# Patient Record
Sex: FEMALE | Race: WHITE | NOT HISPANIC OR LATINO
[De-identification: names, ages, dates, MRNs, and addresses within clinical notes are randomized per-mention and may not be internally consistent; named-entity substitution may affect disease eponyms.]

---

## 2023-07-11 ENCOUNTER — APPOINTMENT (OUTPATIENT)
Dept: OTOLARYNGOLOGY | Facility: CLINIC | Age: 78
End: 2023-07-11
Payer: COMMERCIAL

## 2023-07-11 VITALS
DIASTOLIC BLOOD PRESSURE: 72 MMHG | TEMPERATURE: 97.6 F | BODY MASS INDEX: 23.83 KG/M2 | HEIGHT: 63 IN | SYSTOLIC BLOOD PRESSURE: 126 MMHG | HEART RATE: 56 BPM | WEIGHT: 134.5 LBS

## 2023-07-11 DIAGNOSIS — R09.81 NASAL CONGESTION: ICD-10-CM

## 2023-07-11 DIAGNOSIS — R51.9 HEADACHE, UNSPECIFIED: ICD-10-CM

## 2023-07-11 PROCEDURE — 31231 NASAL ENDOSCOPY DX: CPT | Mod: 52

## 2023-07-11 PROCEDURE — 99204 OFFICE O/P NEW MOD 45 MIN: CPT | Mod: 25

## 2023-07-12 NOTE — HISTORY OF PRESENT ILLNESS
[de-identified] : 75F here for initial evaluation.\par \par For the past 8 months she has had a constellation of complaints.\par She c/o daily headache. Sometimes it is over her temples or scalp or forehead and affects both sides the same. There are no triggers, no light sensitivity, no blurriness and no h/o migraines.\par She reports high pitched noise in both her ears which varies in severity. Sleep is unaffected. There is no otorrhea, otalgia, tinnitus or vertigo. 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 There is some ear fullness that can be improved w jaw movement.\par Lastly, there is nasal congestion/obstruction with postnasal drip and mucus; both sides are affected the same. There is no green/yellow drainage. She has no allergies, but questions mold sensitivity.\par She feels all of these sx started after trying tryvaya nasal spray 12 months ago, which she stopped ~4 months later.\par She has been on multiple nasal steroid sprays, saline and steroid rinses without improvement. \par \par No imaging.\par \par Audiogram 5/11/23:\par R: normal hearing sloping to mild to moderate SNHL. SRT 15, 100%, type A\par L: normal hearing sloping to essentially mild SNHL. SRT 20, 100%, type A\par \par ROS otherwise unremarkable.

## 2023-07-12 NOTE — CONSULT LETTER
[Dear  ___] : Dear  [unfilled], [Courtesy Letter:] : I had the pleasure of seeing your patient, [unfilled], in my office today. [Consult Closing:] : Thank you very much for allowing me to participate in the care of this patient.  If you have any questions, please do not hesitate to contact me. [Sincerely,] : Sincerely, [FreeTextEntry3] : Duane West MD\par Department of Otolaryngology - Head and Neck Surgery

## 2023-07-12 NOTE — PROCEDURE
[FreeTextEntry3] : Nasal Endoscopy:\par -inferior turbinate hypertrophy\par -mild sinonasal mucosal erythema\par -mild b/l septal deviation\par -no mucopus or polyps\par -choana clear

## 2023-07-12 NOTE — PHYSICAL EXAM
[de-identified] : soft, flat [de-identified] : eac clear, tm intact, me clear [Nasal Endoscopy Performed] : nasal endoscopy was performed, see procedure section for findings [Midline] : trachea located in midline position [Normal] : no rashes

## 2023-07-12 NOTE — ASSESSMENT
[FreeTextEntry1] : 75F here for initial evaluation. For the past 8 months she has had a constellation of complaints.\par She c/o daily headache. Mostly it is over her temple (right>left), but sometimes over her scalp or forehead and affects both sides the same. There are no triggers, no light sensitivity, no blurriness and no h/o migraines.\par She reports high pitched noise in both her ears which varies in severity. Sleep is unaffected. There is no otorrhea, otalgia, tinnitus or vertigo. 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 There is some ear fullness that can be improved w jaw movement from time to time.\par Lastly, there is nasal congestion/obstruction with postnasal drip and mucus; both sides are affected the same. There is no green/yellow drainage. She has no allergies, but questions mold sensitivity. She feels all of these sx started after trying tyrvaya nasal spray 12 months ago, which she stopped ~4 months later. During all this time, she has been on multiple nasal steroid sprays, saline and steroid rinses without improvement. There is no imaging.\par Audiogram 5/11/23 shows essentially normal hearing sloping to mild/moderate SNHL, as above w excellent discrim and normal tympanograms. On exam, nasal endoscopy shows turbinate hypertrophy and minimal sinonasal mucosal erythema.\par -Her headaches do not seem rhinogenic in origin given description and exam and I would favor either primary headache syndrome which may be made worse due to TMJ. Would recommend MRI brain given sx length and neurology f/u thereafter.\par -Tinnitus is due to her high frequency SNHL on audiogram, which was discussed; ear protection, diet/lifestyle reviewed.\par -Nasal sx (congestion/obstruction, postnasal drip, mucus) seem due to chronic rhinitis and turbinate hypertrophy which remains despite maximal medical management. Will get CT sinus and RTO thereafter.\par Unclear relationship of all of these sx to her recent usage (and d/c) of the nasal spray tyrvaya, which certainly can cause nasal discomfort and sensitivity issues.

## 2023-09-19 ENCOUNTER — TRANSCRIPTION ENCOUNTER (OUTPATIENT)
Age: 78
End: 2023-09-19

## 2023-09-20 ENCOUNTER — APPOINTMENT (OUTPATIENT)
Dept: MRI IMAGING | Facility: HOSPITAL | Age: 78
End: 2023-09-20
Payer: COMMERCIAL

## 2023-09-20 ENCOUNTER — APPOINTMENT (OUTPATIENT)
Dept: CT IMAGING | Facility: HOSPITAL | Age: 78
End: 2023-09-20
Payer: COMMERCIAL

## 2023-10-04 ENCOUNTER — APPOINTMENT (OUTPATIENT)
Dept: MRI IMAGING | Facility: HOSPITAL | Age: 78
End: 2023-10-04

## 2023-10-04 ENCOUNTER — OUTPATIENT (OUTPATIENT)
Dept: OUTPATIENT SERVICES | Facility: HOSPITAL | Age: 78
LOS: 1 days | End: 2023-10-04
Payer: COMMERCIAL

## 2023-10-04 ENCOUNTER — APPOINTMENT (OUTPATIENT)
Dept: CT IMAGING | Facility: HOSPITAL | Age: 78
End: 2023-10-04

## 2023-10-04 PROCEDURE — 70486 CT MAXILLOFACIAL W/O DYE: CPT | Mod: 26

## 2023-10-04 PROCEDURE — 70553 MRI BRAIN STEM W/O & W/DYE: CPT

## 2023-10-04 PROCEDURE — 70553 MRI BRAIN STEM W/O & W/DYE: CPT | Mod: 26

## 2023-10-04 PROCEDURE — A9585: CPT

## 2023-10-04 PROCEDURE — 70486 CT MAXILLOFACIAL W/O DYE: CPT

## 2024-02-05 ENCOUNTER — NON-APPOINTMENT (OUTPATIENT)
Age: 79
End: 2024-02-05

## 2024-02-05 ENCOUNTER — APPOINTMENT (OUTPATIENT)
Dept: OPHTHALMOLOGY | Facility: CLINIC | Age: 79
End: 2024-02-05
Payer: COMMERCIAL

## 2024-02-05 PROCEDURE — 92136 OPHTHALMIC BIOMETRY: CPT

## 2024-02-05 PROCEDURE — 92025 CPTRIZED CORNEAL TOPOGRAPHY: CPT

## 2024-02-05 PROCEDURE — 92004 COMPRE OPH EXAM NEW PT 1/>: CPT

## 2024-02-05 PROCEDURE — 92250 FUNDUS PHOTOGRAPHY W/I&R: CPT

## 2024-04-24 NOTE — ASU PATIENT PROFILE, ADULT - NS PREOP UNDERSTANDS INFO
Informed pt about surgery time 1000, last meal 0000 no dairy, and last drink 0700 of water or apple juice 3 hrs before surgery. Bring photo ID and isurance card to the first floor. Must have an escort that is 18+. Leave jewelry, piercings, and contacts at home./yes

## 2024-04-24 NOTE — ASU PATIENT PROFILE, ADULT - NSICDXPASTMEDICALHX_GEN_ALL_CORE_FT
PAST MEDICAL HISTORY:  2019 novel coronavirus disease (COVID-19) 3/2024    Arthritis     Breast cancer     Hashimoto's disease     Irritable bowel syndrome (IBS) constipation

## 2024-04-24 NOTE — ASU PATIENT PROFILE, ADULT - NSICDXPASTSURGICALHX_GEN_ALL_CORE_FT
PAST SURGICAL HISTORY:  H/O breast surgery     H/O colonoscopy     H/O elbow surgery left    H/O wrist surgery     History of bunionectomy

## 2024-04-24 NOTE — ASU PATIENT PROFILE, ADULT - FALL HARM RISK - UNIVERSAL INTERVENTIONS
Bed in lowest position, wheels locked, appropriate side rails in place/Call bell, personal items and telephone in reach/Instruct patient to call for assistance before getting out of bed or chair/Non-slip footwear when patient is out of bed/[place] to call system/Physically safe environment - no spills, clutter or unnecessary equipment/Purposeful Proactive Rounding/Room/bathroom lighting operational, light cord in reach

## 2024-04-25 ENCOUNTER — APPOINTMENT (OUTPATIENT)
Dept: OPHTHALMOLOGY | Facility: AMBULATORY SURGERY CENTER | Age: 79
End: 2024-04-25
Payer: COMMERCIAL

## 2024-04-25 ENCOUNTER — TRANSCRIPTION ENCOUNTER (OUTPATIENT)
Age: 79
End: 2024-04-25

## 2024-04-25 ENCOUNTER — OUTPATIENT (OUTPATIENT)
Dept: OUTPATIENT SERVICES | Facility: HOSPITAL | Age: 79
LOS: 1 days | Discharge: HOME | End: 2024-04-25
Payer: COMMERCIAL

## 2024-04-25 VITALS
HEIGHT: 62 IN | HEART RATE: 57 BPM | DIASTOLIC BLOOD PRESSURE: 69 MMHG | RESPIRATION RATE: 16 BRPM | SYSTOLIC BLOOD PRESSURE: 125 MMHG | TEMPERATURE: 98 F | OXYGEN SATURATION: 97 % | WEIGHT: 132.5 LBS

## 2024-04-25 VITALS
OXYGEN SATURATION: 99 % | RESPIRATION RATE: 16 BRPM | DIASTOLIC BLOOD PRESSURE: 69 MMHG | TEMPERATURE: 98 F | HEART RATE: 59 BPM | SYSTOLIC BLOOD PRESSURE: 115 MMHG

## 2024-04-25 DIAGNOSIS — Z98.890 OTHER SPECIFIED POSTPROCEDURAL STATES: Chronic | ICD-10-CM

## 2024-04-25 PROCEDURE — V2787: CPT | Mod: RT

## 2024-04-25 PROCEDURE — 66984 XCAPSL CTRC RMVL W/O ECP: CPT | Mod: RT

## 2024-04-25 PROCEDURE — 6698F: CPT

## 2024-04-25 DEVICE — IMPLANTABLE DEVICE
Type: IMPLANTABLE DEVICE | Status: NON-FUNCTIONAL
Removed: 2024-04-25

## 2024-04-25 RX ORDER — OFLOXACIN 0.3 %
1 DROPS OPHTHALMIC (EYE)
Refills: 0 | Status: DISCONTINUED | OUTPATIENT
Start: 2024-04-25 | End: 2024-04-25

## 2024-04-25 RX ORDER — TROPICAMIDE 1 %
1 DROPS OPHTHALMIC (EYE)
Refills: 0 | Status: DISCONTINUED | OUTPATIENT
Start: 2024-04-25 | End: 2024-04-25

## 2024-04-25 RX ORDER — OXYCODONE HYDROCHLORIDE 5 MG/1
5 TABLET ORAL ONCE
Refills: 0 | Status: DISCONTINUED | OUTPATIENT
Start: 2024-04-25 | End: 2024-04-25

## 2024-04-25 RX ORDER — LINACLOTIDE 145 UG/1
1 CAPSULE, GELATIN COATED ORAL
Refills: 0 | DISCHARGE

## 2024-04-25 RX ORDER — LIOTHYRONINE SODIUM 25 UG/1
1 TABLET ORAL
Refills: 0 | DISCHARGE

## 2024-04-25 RX ORDER — LEVOTHYROXINE SODIUM 125 MCG
1 TABLET ORAL
Refills: 0 | DISCHARGE

## 2024-04-25 RX ORDER — PHENYLEPHRINE HCL 2.5 %
1 DROPS OPHTHALMIC (EYE)
Refills: 0 | Status: DISCONTINUED | OUTPATIENT
Start: 2024-04-25 | End: 2024-04-25

## 2024-04-25 RX ORDER — SODIUM CHLORIDE 9 MG/ML
1000 INJECTION, SOLUTION INTRAVENOUS
Refills: 0 | Status: DISCONTINUED | OUTPATIENT
Start: 2024-04-25 | End: 2024-04-25

## 2024-04-25 RX ORDER — ACETAMINOPHEN 500 MG
1000 TABLET ORAL ONCE
Refills: 0 | Status: DISCONTINUED | OUTPATIENT
Start: 2024-04-25 | End: 2024-04-25

## 2024-04-25 RX ADMIN — Medication 1 DROP(S): at 11:04

## 2024-04-25 RX ADMIN — Medication 1 DROP(S): at 10:55

## 2024-04-25 NOTE — OPERATIVE REPORT - OPERATIVE RPOSRT DETAILS
SURGEON: Pavan Estrada MD    ASSISTANT SURGEON:  Kaitlin Chaparro MD    DATE OF SURGERY:  04/25/24    ANESTHESIA:  MAC/TOPICAL		    COMPLICATIONS: none		    PREOPERATIVE DIAGNOSIS:  Nuclear sclerotic cataract and astigmatism, right eye    POSTOPERATIVE DIAGNOSIS:  Nuclear sclerotic cataract and astigmatism, right eye    OPERATIVE PROCEDURE:  1. Femtosecond laser assisted laser surgery, right eye  2. Phacoemulsification with insertion of posterior chamber intraocular lens, right eye  3. Intraoperative ORA, right eye    LENS IMPLANT: [id number] +22.0D    PROCEDURE:	The patient was brought to the laser room where certain aspects of the procedure were performed with the femtosecond laser.     The patient was then brought into the operating room and placed supine on the operating room table. After uneventful induction of neuroleptic anesthesia, topical tetracaine was instilled into the operative eye achieving sufficient anesthesia.  The patient was then prepped and draped in the usual sterile fashion.  A wire lid speculum was then inserted into the operative eye giving a wide palpebral fissure.      	A gerhard knife was used to perform paracenteses through clear cornea at the 12 and 6 o’clock limbal positions.  The anterior chamber was irrigated with lidocaine 1% nonpreserved followed by epinephrine 0.1% nonpreserved.  Viscoelastic was then used to maintain the anterior chamber.  A keratome was used to create a clear corneal incision just inside the temporal limbus.  Capsulorhexis forceps were used to complete the capsulorhexis. Balanced salt solution was used to hydrodissect the nucleus.  The [employer] phacoemulsification unit was then used to completely phacoemulsify the nucleus, following which an aspirating handpiece was used to aspirate all cortical remnants from the capsular bag.  Viscoelastic was  used to reform the anterior chamber and capsular bag.      	Intraoperative ORA was performed to help determine the appropriate IOL. A new foldable posterior chamber intraocular lens was brought into the surgical field.  It was folded and directly injected into the capsular bag following which ORA was again performed to determine the appropriate orientation of the IOL. All viscoelastic was then aspirated from the anterior segment using an aspirating handpiece.  All wounds were checked for leaks and there were none.  Tobradex ophthalmic solution was used to irrigate the surface of the eye.  The lid speculum was removed from the eye and a shield placed over the eye.      	The patient tolerated the procedure well and went to the recovery area in good condition.    			  ATTESTATION  I, Pavan Estrada, was present for the entirety of this surgical procedure.

## 2024-04-26 ENCOUNTER — NON-APPOINTMENT (OUTPATIENT)
Age: 79
End: 2024-04-26

## 2024-04-26 ENCOUNTER — APPOINTMENT (OUTPATIENT)
Dept: OPHTHALMOLOGY | Facility: CLINIC | Age: 79
End: 2024-04-26
Payer: COMMERCIAL

## 2024-04-26 PROCEDURE — 99024 POSTOP FOLLOW-UP VISIT: CPT

## 2024-05-02 ENCOUNTER — APPOINTMENT (OUTPATIENT)
Dept: OPHTHALMOLOGY | Facility: CLINIC | Age: 79
End: 2024-05-02

## 2024-07-01 NOTE — PRE-ANESTHESIA EVALUATION ADULT - NSANTHINDVINFOSD_GEN_ALL_CORE
Call [phone] for appointment with general surgery     If fever, severe abdominal pain or not able to eat or drink, go to the ER       patient

## (undated) DEVICE — VENODYNE/SCD SLEEVE CALF MEDIUM

## (undated) DEVICE — Device

## (undated) DEVICE — CANNULA ANT CHMBR 27GX22MM

## (undated) DEVICE — GOWN ROYAL SILK XL

## (undated) DEVICE — RUBBER BANDS STERILE

## (undated) DEVICE — WARMING BLANKET LOWER ADULT

## (undated) DEVICE — SOL IRR BAL SALT 500ML

## (undated) DEVICE — SPECIMEN CONTAINER 4OZ

## (undated) DEVICE — PACK CENTURION 2.4MM

## (undated) DEVICE — GLV 8 PROTEXIS (WHITE)

## (undated) DEVICE — CANNULA FLEX TIP 45 DEG 27GAX22MM

## (undated) DEVICE — SUT NYLON 10-0 12" CU-5

## (undated) DEVICE — DRAPE MICROSCOPE KNOB COVER SMALL (2 PCS)

## (undated) DEVICE — PACK ANTERIOR SEGMENT

## (undated) DEVICE — CANNULA IRR ANT CHAMBER 30G

## (undated) DEVICE — DRSG TAPE MICROPORE SURGICAL TAPE .5"X10 YDS TAN